# Patient Record
(demographics unavailable — no encounter records)

---

## 2025-05-09 NOTE — HISTORY OF PRESENT ILLNESS
[de-identified] : The patient is a 17 year old _ hand dominant female  who presents today for _. Date of Injury/Onset: Pain: At Rest: With Activity:  Mechanism of injury: This is _ a Work Related Injury being treated under Worker's Compensation. This is _ an athletic injury occurring associated with an interscholastic or organized sports team. Quality of symptoms: Improves with: Worse with: Prior treatment: Prior Imaging: Out of work/sport: _, since _ School/Sport/Position/Occupation: Additional Information: